# Patient Record
Sex: FEMALE | Race: WHITE | Employment: FULL TIME | URBAN - METROPOLITAN AREA
[De-identification: names, ages, dates, MRNs, and addresses within clinical notes are randomized per-mention and may not be internally consistent; named-entity substitution may affect disease eponyms.]

---

## 2019-02-01 ENCOUNTER — HOSPITAL ENCOUNTER (OUTPATIENT)
Dept: NUTRITION | Age: 64
Discharge: HOME | End: 2019-02-01
Payer: COMMERCIAL

## 2019-02-01 PROCEDURE — 97802 MEDICAL NUTRITION INDIV IN: CPT

## 2019-02-04 NOTE — PROGRESS NOTES
[address] [place] [address] 51, 45 [facility], [place], 70 [facility] Phone: [phone]  Fax: [phone] Nutrition Assessment  Medical Nutrition Therapy Outpatient Initial Evaluation Patient Name: Lieutenant Damon : 1955 Treatment Diagnosis: Pre diabetes; HLD Referral Source: Taina Cash MD [facility]): 2019 Gender: female Age: 59 y.o. Ht: 65 In Wt: 206.8 lb  kg BMI: 34.4 BMR Male  [facility] Female [id number] Past Medical History includes: CAD; HTN Pertinent Medications:  
metformin Biochemical Data:  
A1c: 6.3; TC: 142; T; HDL: 41 Subjective/Assessment: 
 Pt stated she has significant heart disease, including CABG in  and 7 stents placed. Her PCP has been watching her A1c for several years. Pt works full time as a  and lives at home with her . She exercises 2d/wk for 1 hr each time. Pt's goal is to improve her health and wellness and to maintain a healthy weight. Current Eating Patterns: B- [place]  delights sandwich Sn- nuts, yogurt, granola bar L- salad or frozen meal 
Sn- wine, cheese + crackers D- sandwich or grilled chicken + veggies or mac/cheese Sn- ice cream or cookie Pt's diet is low in protein overall. She admits to drinking 1-2 glasses of wine per day. She typically eats 4-5 times a day but not always consistent with the times. Pt does not eat beef, pork or shrimp. Estimate Needs Calories:  1300 Protein: 98 Carbs: 130 Fat: 43 Kcal/day  g/day  g/day  g/day   
    percent: 30  40  30 Education & Recommendations provided: Educated pt on the pathophysiology of Type II Diabetes, insulin resistance and the rationale for dietary modifications and increased activity.  Educated pt macronutrient composition of various foods and provided specific recommendations for intake at each scheduled meal and snack. Also discussed the importance of establishing a consistent lifestyle and eating schedule to promote a more efficient metabolism. Handouts Provided: []  Carbohydrates 
[]  Protein []  Fiber 
[]  Serving Sizes []  Meal and Snack Ideas 
[]  Food Journals []  Diabetes []  Cholesterol 
[]  Sodium 
[x]  Meal Builder 
[x]  Diet Plan 
[]  Others:  
Information Reviewed with: pt  
Readiness to Change Stage: []  Pre-contemplative    []  Contemplative [x]  Preparation               []  Action                  []  Maintenance Potential Barriers to Learning: []  Decline in memory    []  Language barrier   []  Other: 
[]  Emotional                  []  Limited mobility 
[]  Lack of motivation     [] Vision, hearing or cognitive impairment Expected Compliance: Good Nutritional Goal - To promote lifestyle changes to result in:   
[x]  Weight loss [x]  Improved diabetic control [x]  Decreased cholesterol levels [x]  Decreased blood pressure 
[]  Weight maintenance []  Preventing any interactions associated with food allergies []  Adequate weight gain toward goal weight 
[]  Other:  
  
 
Patient Goals: SMART goals 1. Always combine and balance carbohydrate and protein 2. Eat 2-3 hrs after snacks and 4-5 hrs after meals Dietitian Signature: Milly Rodrigues, MS, RD, CSSD Date: 2/4/2019 Follow-up: Fri Mar 29 at 1:00 Time: 11:27 AM

## 2019-03-29 ENCOUNTER — HOSPITAL ENCOUNTER (OUTPATIENT)
Dept: NUTRITION | Age: 64
Discharge: HOME | End: 2019-03-29
Payer: COMMERCIAL

## 2019-03-29 PROCEDURE — 97803 MED NUTRITION INDIV SUBSEQ: CPT

## 2019-04-03 NOTE — PROGRESS NOTES
NUTRITION  FOLLOW-UP TREATMENT NOTE Patient Name: Val Brooks         Date: 4/3/2019 : 1955    YES Patient  Verified Diagnosis: pre diabetes; HLD In time:   100             Out time:   130 Total Treatment Time (min):   30  
SUBJECTIVE/ASSESSMENT Changes in medication or medical history? Any new allergies, surgeries or procedures?    /NO    If yes, update Summary List  
  
 
  
Current Wt: 205 Previous Wt: 206.8 Wt Change: -1.8 Achievement of Goals: Pt stated she is very pleased with her progress so far. She has adjusted well to the new eating schedule, although admitted she feels like it's a lot of food. Per pt's dietary recall, she may be eating larger than recommended servings of carbohydrate at dinner and smaller than recommended portions of lean protein. She also has been more inconsistent with eating her snacks. Pt's dog had surgery 2 weeks ago, which has made it more difficult to keep up with her own eating schedule since the dog has taken priority. Patient Education:  [x]  Review current plan with patient  
[]  Other:   
Handouts/ Information Provided: []  Carbohydrates 
[]  Protein []  Fiber 
[]  Serving Sizes []  Fluids 
[]  General guidelines []  Diabetes []  Cholesterol 
[]  Sodium []  SBGM []  Food Journals 
[]  Others:  
New Patient Goals: 1. Increase protein at dinner to 4-6 oz 
2. Incorporate 1 serving of carbohydrate with each snack 3. Avoid eating >2 servings of carbohydrate at dinner PLAN [x]  Continue on current plan []  Follow-up PRN  
[]  Discharge due to :   
[x]  Next appt:  at 1:00 Dietitian: Lu Roberts MS, RD, CSSD Date: 4/3/2019 Time: 3:48 PM

## 2019-08-09 ENCOUNTER — APPOINTMENT (OUTPATIENT)
Dept: NUTRITION | Age: 64
End: 2019-08-09

## 2019-08-16 ENCOUNTER — HOSPITAL ENCOUNTER (OUTPATIENT)
Dept: NUTRITION | Age: 64
Discharge: HOME | End: 2019-08-16
Payer: COMMERCIAL

## 2019-08-16 PROCEDURE — 97803 MED NUTRITION INDIV SUBSEQ: CPT

## 2019-08-16 NOTE — PROGRESS NOTES
NUTRITION  FOLLOW-UP TREATMENT NOTE  Patient Name: Jose Murry         Date: 2019  : 1955    YES Patient  Verified  Diagnosis: pre diabetes; HLD   In time:   100             Out time:   130   Total Treatment Time (min):   30     SUBJECTIVE/ASSESSMENT    Changes in medication or medical history? Any new allergies, surgeries or procedures? YES/   If yes, update Summary List    Pt had cardiac cath in  unstable angina. No stent was placed. Pt was told to increase her nitro daily. Current Wt: 207.8 Previous Wt: 205 Wt Change: +2.8     Achievement of Goals: Pt has not consciously made any changes in her eating habits. She has been eating more salads and grilled meats for meals, but is not aware of how much or when she is eating. Additionally, her intake from day to day varies. She also alluded to her alcohol intake, but never specified how much she drinks. Pt planning a trip to [place] with a friend the week before . Patient Education:  [x]  Review current plan with patient   []  Other:    Handouts/  Information Provided: []  Carbohydrates  []  Protein  []  Fiber  []  Serving Sizes  []  Fluids  []  General guidelines []  Diabetes  []  Cholesterol  []  Sodium  []  SBGM  []  Food Journals  []  Others:      New Patient Goals: 1. Use non-starchy veggies for a side dish only  2. Always balance carb and protein  3. Eat at scheduled times     PLAN    [x]  Continue on current plan []  Follow-up PRN   []  Discharge due to :    [x]  Next appt:  Oct 24 at 1:00     Dietitian: Brynn Preciado MS, RD, CSSD    Date: 2019 Time: 3:15 PM

## 2019-10-24 ENCOUNTER — HOSPITAL ENCOUNTER (OUTPATIENT)
Dept: NUTRITION | Age: 64
Discharge: HOME | End: 2019-10-24
Payer: COMMERCIAL

## 2019-10-24 PROCEDURE — 97803 MED NUTRITION INDIV SUBSEQ: CPT

## 2019-10-29 NOTE — PROGRESS NOTES
NUTRITION  FOLLOW-UP TREATMENT NOTE  Patient Name: Libby Ureña         Date: 10/29/2019  : 1955    YES Patient  Verified  Diagnosis: pre diabetes; HLD   In time:   100             Out time:   130   Total Treatment Time (min):   30     SUBJECTIVE/ASSESSMENT    Changes in medication or medical history? Any new allergies, surgeries or procedures?    /NO    If yes, update Summary List      Pt lacks motivation to make any significant changes in her eating habits or overall lifestyle. She has gained 1 lb since our initial appointment in February. Current Wt: 207.8 Previous Wt: 205 Wt Change: +2.8     Achievement of Goals: 1. Use non-starchy veggies for a side dish only- NOT MET; CONT  2. Always balance carb and protein- NOT MET; CONT  3. Eat at scheduled times-NOT MET; CONT     Patient Education:  [x]  Review current plan with patient   []  Other:    Handouts/  Information Provided: []  Carbohydrates  []  Protein  []  Fiber  []  Serving Sizes  []  Fluids  []  General guidelines []  Diabetes  []  Cholesterol  []  Sodium  []  SBGM  []  Food Journals  []  Others:      New Patient Goals: 1. Set alarms as reminders to eat  2. Meal plan weekly  3.  Eat a consistent amount of carb daily (125-135g/d)     PLAN    [x]  Continue on current plan []  Follow-up PRN   []  Discharge due to :    [x]  Next appt:  at 1:00     Dietitian: Dayo Damon MS, RD, CSSD    Date: 10/29/2019 Time: 11:22 AM

## 2020-01-16 ENCOUNTER — HOSPITAL ENCOUNTER (OUTPATIENT)
Dept: NUTRITION | Age: 65
End: 2020-01-16